# Patient Record
Sex: FEMALE | HISPANIC OR LATINO
[De-identification: names, ages, dates, MRNs, and addresses within clinical notes are randomized per-mention and may not be internally consistent; named-entity substitution may affect disease eponyms.]

---

## 2018-07-30 ENCOUNTER — RESULT REVIEW (OUTPATIENT)
Age: 63
End: 2018-07-30

## 2021-01-23 ENCOUNTER — APPOINTMENT (OUTPATIENT)
Dept: OBGYN | Facility: CLINIC | Age: 66
End: 2021-01-23
Payer: COMMERCIAL

## 2021-01-23 VITALS
WEIGHT: 178 LBS | DIASTOLIC BLOOD PRESSURE: 80 MMHG | TEMPERATURE: 97.3 F | BODY MASS INDEX: 29.66 KG/M2 | HEIGHT: 65 IN | SYSTOLIC BLOOD PRESSURE: 130 MMHG

## 2021-01-23 DIAGNOSIS — Z01.419 ENCOUNTER FOR GYNECOLOGICAL EXAMINATION (GENERAL) (ROUTINE) W/OUT ABNORMAL FINDINGS: ICD-10-CM

## 2021-01-23 PROBLEM — Z00.00 ENCOUNTER FOR PREVENTIVE HEALTH EXAMINATION: Status: ACTIVE | Noted: 2021-01-23

## 2021-01-23 PROCEDURE — 99072 ADDL SUPL MATRL&STAF TM PHE: CPT

## 2021-01-23 PROCEDURE — 99396 PREV VISIT EST AGE 40-64: CPT

## 2021-01-23 RX ORDER — MULTIVITAMIN
TABLET ORAL
Refills: 0 | Status: ACTIVE | COMMUNITY

## 2021-01-23 RX ORDER — PNV NO.95/FERROUS FUM/FOLIC AC 28MG-0.8MG
TABLET ORAL
Refills: 0 | Status: ACTIVE | COMMUNITY

## 2021-01-23 NOTE — HISTORY OF PRESENT ILLNESS
[Y] : Patient uses contraception [Tubal Occlusion] : has had a tubal occlusion [N] : Patient is not sexually active [LMP unknown] : LMP unknown [unknown] : Patient is unsure of the date of her LMP [Menarche Age: ____] : age at menarche was [unfilled] [Previously active] : previously active [Men] : men [Vaginal] : vaginal [No] : No [TextBox_4] : Pt presents for annual GYN \par \par LMP 2009- [PapSmeardate] : 7/30/2018 [TextBox_31] : WNL   [PGHxTotal] : 2 [[facility] [TextBox_9] : 13

## 2021-01-23 NOTE — COUNSELING
[Nutrition/ Exercise/ Weight Management] : nutrition, exercise, weight management [Vitamins/Supplements] : vitamins/supplements [Sunscreen] : sunscreen [Breast Self Exam] : breast self exam [Bladder Hygiene] : bladder hygiene [Vaccines] : vaccines

## 2021-01-26 LAB — HPV HIGH+LOW RISK DNA PNL CVX: NOT DETECTED

## 2021-01-29 LAB — CYTOLOGY CVX/VAG DOC THIN PREP: NORMAL

## 2021-08-09 DIAGNOSIS — Z13.820 ENCOUNTER FOR SCREENING FOR OSTEOPOROSIS: ICD-10-CM

## 2022-02-26 ENCOUNTER — APPOINTMENT (OUTPATIENT)
Dept: OBGYN | Facility: CLINIC | Age: 67
End: 2022-02-26
Payer: COMMERCIAL

## 2022-02-26 VITALS
BODY MASS INDEX: 29.66 KG/M2 | WEIGHT: 178 LBS | SYSTOLIC BLOOD PRESSURE: 140 MMHG | DIASTOLIC BLOOD PRESSURE: 84 MMHG | HEIGHT: 65 IN

## 2022-02-26 PROCEDURE — 99397 PER PM REEVAL EST PAT 65+ YR: CPT

## 2022-02-26 NOTE — HISTORY OF PRESENT ILLNESS
[LMP unknown] : LMP unknown [postmenopausal] : postmenopausal [N] : Patient does not use contraception [Y] : Positive pregnancy history [unknown] : Patient is unsure of the date of her LMP [Menarche Age: ____] : age at menarche was [unfilled] [No] : Patient does not have concerns regarding sex [FreeTextEntry1] : 66-year-old postmenopausal patient presents for annual gynecological exam without complaints\par \par Patient denies any vaginal bleeding\par \par She is planning to retire in [place] in 1 year and having a stressful time working the  at her current office\par \par She reports routine screening colonoscopies every few years related to her history of colorectal polyps\par \par We discussed her mildly elevated blood pressure and I advise she follow-up with her primary care and purchase a home monitor to self monitor daily parameters for immediate attention were reviewed.  Patient denies any chest pain shortness of breath or palpitations.\par \par The benefits of a low-sodium diet and daily exercise were reviewed and encouraged [Mammogramdate] : 11/17/21 [TextBox_19] : BR1 [PapSmeardate] : 1/23/21 [TextBox_31] : NEG [BoneDensityDate] : 9/08/21 [TextBox_37] : OSTEOPENIA [HPVDate] : 1/23/21 [TextBox_78] : NEG [PGHxTotal] : 2 [[facility]] : 2 [[facility]] : 2

## 2022-02-26 NOTE — PLAN
[FreeTextEntry1] : Benefits of daily exercise calcium and vitamin D was reviewed\par \par Benefits of an annual skin screening exam with dermatology was reviewed\par \par Benefits of close follow-up on blood pressure and risks if left untreated and elevated reviewed with patient who verbalized good understanding.\par \par Follow-up in 1 year

## 2022-03-01 LAB — HPV HIGH+LOW RISK DNA PNL CVX: NOT DETECTED

## 2022-03-05 LAB — CYTOLOGY CVX/VAG DOC THIN PREP: NORMAL

## 2023-03-25 ENCOUNTER — NON-APPOINTMENT (OUTPATIENT)
Age: 68
End: 2023-03-25

## 2023-03-25 ENCOUNTER — APPOINTMENT (OUTPATIENT)
Dept: OBGYN | Facility: CLINIC | Age: 68
End: 2023-03-25
Payer: COMMERCIAL

## 2023-03-25 VITALS
SYSTOLIC BLOOD PRESSURE: 122 MMHG | BODY MASS INDEX: 29.66 KG/M2 | WEIGHT: 178 LBS | HEIGHT: 65 IN | DIASTOLIC BLOOD PRESSURE: 74 MMHG

## 2023-03-25 DIAGNOSIS — Z01.419 ENCOUNTER FOR GYNECOLOGICAL EXAMINATION (GENERAL) (ROUTINE) W/OUT ABNORMAL FINDINGS: ICD-10-CM

## 2023-03-25 DIAGNOSIS — Z12.39 ENCOUNTER FOR OTHER SCREENING FOR MALIGNANT NEOPLASM OF BREAST: ICD-10-CM

## 2023-03-25 DIAGNOSIS — Z80.0 FAMILY HISTORY OF MALIGNANT NEOPLASM OF DIGESTIVE ORGANS: ICD-10-CM

## 2023-03-25 DIAGNOSIS — N64.59 OTHER SIGNS AND SYMPTOMS IN BREAST: ICD-10-CM

## 2023-03-25 DIAGNOSIS — Z82.49 FAMILY HISTORY OF ISCHEMIC HEART DISEASE AND OTHER DISEASES OF THE CIRCULATORY SYSTEM: ICD-10-CM

## 2023-03-25 DIAGNOSIS — Z78.9 OTHER SPECIFIED HEALTH STATUS: ICD-10-CM

## 2023-03-25 DIAGNOSIS — R92.2 INCONCLUSIVE MAMMOGRAM: ICD-10-CM

## 2023-03-25 DIAGNOSIS — F17.210 NICOTINE DEPENDENCE, CIGARETTES, UNCOMPLICATED: ICD-10-CM

## 2023-03-25 DIAGNOSIS — Z12.4 ENCOUNTER FOR SCREENING FOR MALIGNANT NEOPLASM OF CERVIX: ICD-10-CM

## 2023-03-25 PROCEDURE — 99397 PER PM REEVAL EST PAT 65+ YR: CPT

## 2023-03-25 RX ORDER — ACETAMINOPHEN 325 MG
TABLET ORAL
Refills: 0 | Status: DISCONTINUED | COMMUNITY
End: 2023-03-25

## 2023-03-25 RX ORDER — CALCIUM PHOSPHATE DIBAS/VIT D3 100MG-3MCG
TABLET ORAL
Refills: 0 | Status: ACTIVE | COMMUNITY

## 2023-03-25 NOTE — PLAN
[FreeTextEntry1] : - rx for 6 mo follow up US provided.\par - screening mammo rx provided for later in the year\par - she has a colonoscopy planned for 5/18/23\par - The benefits of adequate calcium and vitamin D3 intake combined with weight bearing exercise for bone protection were reviewed.\par

## 2023-03-25 NOTE — HISTORY OF PRESENT ILLNESS
[HPV test offered] : HPV test offered [postmenopausal] : postmenopausal [Y] : Positive pregnancy history [Post-Menopause, No Sxs] : post-menopausal, currently without symptoms [No] : Patient does not have concerns regarding sex [N] : Patient is not sexually active [Menarche Age: ____] : age at menarche was [unfilled] [Previously active] : previously active [TextBox_4] : Isael is here for an annual exam. she denies gyn complaints at this time.\par \par She is due in May/June for a 6 mo follow up left breast US for surveillance of a nodule noted at the time of her screening mammo/sono.  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  Plans to spend time with her family in [place] and the  [TextBox_19] : br1 [Mammogramdate] : 12/21/22 [BreastSonogramDate] : 12/21/22 [TextBox_25] : br3 [PapSmeardate] : 2/26/22 [TextBox_31] : neg [BoneDensityDate] : 9/8/21 [HPVDate] : 2/26/22 [TextBox_78] : neg [LMPDate] : at age52 [[facility] [PGHxTotal] : 2 [[facility]] : 2 [FreeTextEntry1] : at age 53

## 2023-03-25 NOTE — PHYSICAL EXAM
[Chaperone Present] : A chaperone was present in the examining room during all aspects of the physical examination [Appropriately responsive] : appropriately responsive [Alert] : alert [No Acute Distress] : no acute distress [Soft] : soft [Non-distended] : non-distended [Non-tender] : non-tender [No Lesions] : no lesions [No Mass] : no mass [Oriented x3] : oriented x3 [Examination Of The Breasts] : a normal appearance [No Masses] : no breast masses were palpable [Atrophy] : atrophy [Normal] : normal [Uterine Adnexae] : normal [FreeTextEntry1] : FREDERICK

## 2023-03-27 LAB — HPV HIGH+LOW RISK DNA PNL CVX: NOT DETECTED

## 2023-03-29 LAB — CYTOLOGY CVX/VAG DOC THIN PREP: ABNORMAL

## 2023-08-23 ENCOUNTER — APPOINTMENT (OUTPATIENT)
Dept: OPHTHALMOLOGY | Facility: CLINIC | Age: 68
End: 2023-08-23
Payer: COMMERCIAL

## 2023-08-23 ENCOUNTER — NON-APPOINTMENT (OUTPATIENT)
Age: 68
End: 2023-08-23

## 2023-08-23 PROCEDURE — 92004 COMPRE OPH EXAM NEW PT 1/>: CPT

## 2023-09-06 ENCOUNTER — APPOINTMENT (OUTPATIENT)
Dept: OPHTHALMOLOGY | Facility: CLINIC | Age: 68
End: 2023-09-06
Payer: COMMERCIAL

## 2023-09-06 ENCOUNTER — NON-APPOINTMENT (OUTPATIENT)
Age: 68
End: 2023-09-06

## 2023-09-06 PROCEDURE — 92012 INTRM OPH EXAM EST PATIENT: CPT

## 2023-09-14 ENCOUNTER — APPOINTMENT (OUTPATIENT)
Dept: OPHTHALMOLOGY | Facility: CLINIC | Age: 68
End: 2023-09-14

## 2023-09-18 ENCOUNTER — APPOINTMENT (OUTPATIENT)
Dept: OPHTHALMOLOGY | Facility: CLINIC | Age: 68
End: 2023-09-18
Payer: COMMERCIAL

## 2023-09-18 ENCOUNTER — NON-APPOINTMENT (OUTPATIENT)
Age: 68
End: 2023-09-18

## 2023-09-18 PROCEDURE — 92012 INTRM OPH EXAM EST PATIENT: CPT

## 2023-10-02 ENCOUNTER — NON-APPOINTMENT (OUTPATIENT)
Age: 68
End: 2023-10-02

## 2023-10-02 ENCOUNTER — APPOINTMENT (OUTPATIENT)
Dept: OPHTHALMOLOGY | Facility: CLINIC | Age: 68
End: 2023-10-02
Payer: COMMERCIAL

## 2023-10-02 PROCEDURE — 92012 INTRM OPH EXAM EST PATIENT: CPT

## 2024-01-31 ENCOUNTER — NON-APPOINTMENT (OUTPATIENT)
Age: 69
End: 2024-01-31

## 2024-01-31 ENCOUNTER — APPOINTMENT (OUTPATIENT)
Dept: OPHTHALMOLOGY | Facility: CLINIC | Age: 69
End: 2024-01-31
Payer: MEDICARE

## 2024-01-31 PROCEDURE — 92012 INTRM OPH EXAM EST PATIENT: CPT

## 2024-02-07 ENCOUNTER — APPOINTMENT (OUTPATIENT)
Dept: OBGYN | Facility: CLINIC | Age: 69
End: 2024-02-07

## 2024-02-12 ENCOUNTER — APPOINTMENT (OUTPATIENT)
Dept: OPHTHALMOLOGY | Facility: CLINIC | Age: 69
End: 2024-02-12

## 2024-11-26 ENCOUNTER — APPOINTMENT (OUTPATIENT)
Dept: OPHTHALMOLOGY | Facility: CLINIC | Age: 69
End: 2024-11-26
Payer: MEDICARE

## 2024-11-26 ENCOUNTER — NON-APPOINTMENT (OUTPATIENT)
Age: 69
End: 2024-11-26

## 2024-11-26 PROCEDURE — 92020 GONIOSCOPY: CPT

## 2024-11-26 PROCEDURE — 92014 COMPRE OPH EXAM EST PT 1/>: CPT
